# Patient Record
Sex: FEMALE | Race: WHITE | HISPANIC OR LATINO | Employment: UNEMPLOYED | URBAN - METROPOLITAN AREA
[De-identification: names, ages, dates, MRNs, and addresses within clinical notes are randomized per-mention and may not be internally consistent; named-entity substitution may affect disease eponyms.]

---

## 2017-11-29 ENCOUNTER — HOSPITAL ENCOUNTER (EMERGENCY)
Facility: HOSPITAL | Age: 42
Discharge: HOME | End: 2017-11-29
Attending: EMERGENCY MEDICINE
Payer: COMMERCIAL

## 2017-11-29 VITALS
OXYGEN SATURATION: 100 % | HEIGHT: 60 IN | SYSTOLIC BLOOD PRESSURE: 140 MMHG | TEMPERATURE: 99 F | HEART RATE: 70 BPM | RESPIRATION RATE: 18 BRPM | DIASTOLIC BLOOD PRESSURE: 67 MMHG | BODY MASS INDEX: 34.36 KG/M2 | WEIGHT: 175 LBS

## 2017-11-29 DIAGNOSIS — D50.0 IRON DEFICIENCY ANEMIA DUE TO CHRONIC BLOOD LOSS: ICD-10-CM

## 2017-11-29 DIAGNOSIS — R10.2 PELVIC PAIN: ICD-10-CM

## 2017-11-29 DIAGNOSIS — D25.9 UTERINE LEIOMYOMA, UNSPECIFIED LOCATION: Primary | ICD-10-CM

## 2017-11-29 LAB
ABO + RH BLD: NORMAL
ALBUMIN SERPL BCP-MCNC: 3.3 G/DL
ALP SERPL-CCNC: 81 U/L
ALT SERPL W/O P-5'-P-CCNC: 13 U/L
ANION GAP SERPL CALC-SCNC: 10 MMOL/L
ANISOCYTOSIS BLD QL SMEAR: ABNORMAL
AST SERPL-CCNC: 20 U/L
BASOPHILS # BLD AUTO: 0.02 K/UL
BASOPHILS NFR BLD: 0.2 %
BILIRUB SERPL-MCNC: 0.6 MG/DL
BLD GP AB SCN CELLS X3 SERPL QL: NORMAL
BUN SERPL-MCNC: 8 MG/DL
CALCIUM SERPL-MCNC: 8.8 MG/DL
CHLORIDE SERPL-SCNC: 106 MMOL/L
CO2 SERPL-SCNC: 21 MMOL/L
CREAT SERPL-MCNC: 0.6 MG/DL
DIFFERENTIAL METHOD: ABNORMAL
EOSINOPHIL # BLD AUTO: 0 K/UL
EOSINOPHIL NFR BLD: 0.5 %
ERYTHROCYTE [DISTWIDTH] IN BLOOD BY AUTOMATED COUNT: 20.6 %
EST. GFR  (AFRICAN AMERICAN): >60 ML/MIN/1.73 M^2
EST. GFR  (NON AFRICAN AMERICAN): >60 ML/MIN/1.73 M^2
GLUCOSE SERPL-MCNC: 91 MG/DL
HCT VFR BLD AUTO: 30.8 %
HGB BLD-MCNC: 9.1 G/DL
HYPOCHROMIA BLD QL SMEAR: ABNORMAL
IRON SERPL-MCNC: 27 UG/DL
LYMPHOCYTES # BLD AUTO: 1.5 K/UL
LYMPHOCYTES NFR BLD: 18.8 %
MCH RBC QN AUTO: 17.6 PG
MCHC RBC AUTO-ENTMCNC: 29.5 G/DL
MCV RBC AUTO: 60 FL
MONOCYTES # BLD AUTO: 0.7 K/UL
MONOCYTES NFR BLD: 8.4 %
NEUTROPHILS # BLD AUTO: 5.9 K/UL
NEUTROPHILS NFR BLD: 72.1 %
OVALOCYTES BLD QL SMEAR: ABNORMAL
PLATELET # BLD AUTO: 341 K/UL
PLATELET BLD QL SMEAR: ABNORMAL
PMV BLD AUTO: 8.5 FL
POIKILOCYTOSIS BLD QL SMEAR: SLIGHT
POLYCHROMASIA BLD QL SMEAR: ABNORMAL
POTASSIUM SERPL-SCNC: 3.9 MMOL/L
PROT SERPL-MCNC: 7.3 G/DL
RBC # BLD AUTO: 5.18 M/UL
SATURATED IRON: 6 %
SODIUM SERPL-SCNC: 137 MMOL/L
TARGETS BLD QL SMEAR: ABNORMAL
TOTAL IRON BINDING CAPACITY: 463 UG/DL
TRANSFERRIN SERPL-MCNC: 313 MG/DL
WBC # BLD AUTO: 8.17 K/UL

## 2017-11-29 PROCEDURE — 99284 EMERGENCY DEPT VISIT MOD MDM: CPT

## 2017-11-29 PROCEDURE — 86900 BLOOD TYPING SEROLOGIC ABO: CPT

## 2017-11-29 PROCEDURE — 85025 COMPLETE CBC W/AUTO DIFF WBC: CPT

## 2017-11-29 PROCEDURE — 80053 COMPREHEN METABOLIC PANEL: CPT

## 2017-11-29 PROCEDURE — 83540 ASSAY OF IRON: CPT

## 2017-11-29 PROCEDURE — 86901 BLOOD TYPING SEROLOGIC RH(D): CPT

## 2017-11-29 RX ORDER — FERROUS SULFATE 325(65) MG
325 TABLET ORAL DAILY
Qty: 30 TABLET | Refills: 2 | Status: SHIPPED | OUTPATIENT
Start: 2017-11-29

## 2017-11-29 RX ORDER — LISINOPRIL AND HYDROCHLOROTHIAZIDE 10; 12.5 MG/1; MG/1
1 TABLET ORAL DAILY
COMMUNITY

## 2017-11-29 NOTE — ED TRIAGE NOTES
Pt presents to ED today c/o pelvic pain and vaginal bleeding x 5 days. Pt reports this is her cycle, however bleeding is heavier than usual.

## 2017-11-29 NOTE — ED PROVIDER NOTES
Encounter Date: 11/29/2017       History     Chief Complaint   Patient presents with    Vaginal Bleeding     vaginal bleeding x 5 days with pelvic pain     Patient is a 42-year-old female presents to emergency department for evaluation of heavy bleeding that been present for the past 3 days with numerous amounts of dark red blood this morning.  She became dizzy upon standing.  There is no syncope.  She does have some pelvic pain with a palpable mass in her abdomen.  She is Ivorian speaking only and history is limited because I did not have an  at the time of the history however I will get an .  She denies any dysuria but does have some mild lower back discomfort.  She has no chest pain or shortness of breath.          Review of patient's allergies indicates:  No Known Allergies  No past medical history on file.  No past surgical history on file.  No family history on file.  Social History   Substance Use Topics    Smoking status: Not on file    Smokeless tobacco: Not on file    Alcohol use Not on file     Review of Systems   Constitutional: Negative for fever.   HENT: Negative for ear pain, rhinorrhea and sore throat.    Eyes: Negative for pain and visual disturbance.   Respiratory: Negative for cough and shortness of breath.    Cardiovascular: Negative for chest pain.   Gastrointestinal: Negative for abdominal pain, diarrhea, nausea and vomiting.   Genitourinary: Positive for pelvic pain and vaginal bleeding. Negative for difficulty urinating, dysuria, flank pain, vaginal discharge and vaginal pain.   Musculoskeletal: Positive for back pain. Negative for arthralgias.   Skin: Negative for rash.   Neurological: Positive for dizziness and light-headedness. Negative for syncope, weakness, numbness and headaches.   Hematological: Does not bruise/bleed easily.   Psychiatric/Behavioral: Negative for agitation and confusion.   All other systems reviewed and are negative.      Physical Exam      Initial Vitals [11/29/17 0909]   BP Pulse Resp Temp SpO2   124/67 72 18 98.7 °F (37.1 °C) 100 %      MAP       86         Physical Exam    Constitutional: She appears well-developed and well-nourished. No distress.   HENT:   Head: Normocephalic and atraumatic.   Mouth/Throat: Oropharynx is clear and moist.   Eyes: Conjunctivae and EOM are normal. Pupils are equal, round, and reactive to light.   Neck: Normal range of motion. Neck supple. No JVD present.   Cardiovascular: Normal rate, regular rhythm, normal heart sounds and intact distal pulses. Exam reveals no gallop and no friction rub.    No murmur heard.  Pulmonary/Chest: Breath sounds normal. She has no wheezes. She has no rhonchi. She has no rales.   Abdominal: Soft. There is tenderness (pelvic region with large pelvic mass).   Musculoskeletal: She exhibits no edema.   Neurological: She is alert and oriented to person, place, and time. She has normal strength. No sensory deficit.   Skin: Skin is warm and dry. No rash noted. No pallor.         ED Course   Procedures  Labs Reviewed   CBC W/ AUTO DIFFERENTIAL   COMPREHENSIVE METABOLIC PANEL   TYPE & SCREEN             Medical Decision Making:   Patient appears to have anemia likely secondary to bleeding fibroids.  I will start her on iron therapy and have her follow-up with OB/GYN.  She doesn't need transfusion or admission at this time given stable vital signs.  Discharge                    ED Course      Clinical Impression:   The primary encounter diagnosis was Uterine leiomyoma, unspecified location. Diagnoses of Pelvic pain and Iron deficiency anemia due to chronic blood loss were also pertinent to this visit.                           Alfredo Foy MD  11/29/17 1486

## 2017-11-29 NOTE — ED NOTES
APPEARANCE: Alert, oriented and in no acute distress.  CARDIAC: Normal rate and rhythm, no murmur heard.   PERIPHERAL VASCULAR: peripheral pulses present. Normal cap refill. No edema. Warm to touch.    RESPIRATORY:Normal rate and effort, breath sounds clear bilaterally throughout chest. Respirations are equal and unlabored no obvious signs of distress.  GASTRO: soft, bowel sounds normal, no tenderness, no abdominal distention.  MUSC: Full ROM. No bony tenderness or soft tissue tenderness. No obvious deformity.  SKIN: Skin is warm and dry, normal skin turgor, mucous membranes moist.  NEURO: 5/5 strength major flexors/extensors bilaterally. Sensory intact to light touch bilaterally. Sonoita coma scale: eyes open spontaneously-4, oriented & converses-5, obeys commands-6. No neurological abnormalities.   MENTAL STATUS: awake, alert and aware of environment.  EYE: PERRL, both eyes: pupils brisk and reactive to light. Normal size.  ENT: EARS: no obvious drainage. NOSE: no active bleeding.   : RLQ pelvic pain and vaginal bleeding x 5 days

## 2017-12-14 ENCOUNTER — OFFICE VISIT (OUTPATIENT)
Dept: OBSTETRICS AND GYNECOLOGY | Facility: CLINIC | Age: 42
End: 2017-12-14
Payer: COMMERCIAL

## 2017-12-14 VITALS
WEIGHT: 166.44 LBS | DIASTOLIC BLOOD PRESSURE: 88 MMHG | HEIGHT: 60 IN | BODY MASS INDEX: 32.68 KG/M2 | SYSTOLIC BLOOD PRESSURE: 128 MMHG

## 2017-12-14 DIAGNOSIS — D25.1 FIBROIDS, INTRAMURAL: Primary | ICD-10-CM

## 2017-12-14 PROCEDURE — 99203 OFFICE O/P NEW LOW 30 MIN: CPT | Mod: S$GLB,,, | Performed by: OBSTETRICS & GYNECOLOGY

## 2017-12-14 PROCEDURE — 99999 PR PBB SHADOW E&M-EST. PATIENT-LVL III: CPT | Mod: PBBFAC,,, | Performed by: OBSTETRICS & GYNECOLOGY

## 2017-12-15 NOTE — PROGRESS NOTES
"GYNECOLOGY  :  Lotus Fofana is a 42 y.o.    Here today for  ED follow up  Pelvic  pain and heavy menses   Feels bloated, and "heavy'  With menses, bleeds for 10-15 days, with clots .  Was told had fibroids   Unsure about what to do, but is considering having another child ( #6)       Past Medical History:   Diagnosis Date    Anemia     Hypertension      History reviewed. No pertinent surgical history.  Family History   Problem Relation Age of Onset    Diabetes Mother      Social History   Substance Use Topics    Smoking status: Never Smoker    Smokeless tobacco: Never Used    Alcohol use No     OB History    Para Term  AB Living   5 5 5         SAB TAB Ectopic Multiple Live Births                  # Outcome Date GA Lbr Mushtaq/2nd Weight Sex Delivery Anes PTL Lv   5 Term      Vag-Spont      4 Term      Vag-Spont      3 Term      Vag-Spont      2 Term      Vag-Spont      1 Term      Vag-Spont             /88   Ht 5' (1.524 m)   Wt 75.5 kg (166 lb 7.2 oz)   LMP  (Approximate)   BMI 32.51 kg/m²     Last PAP=  normal   LMP = 17  Last Mammogram = n/a  Last Colonoscopy  =n/a      COMPREHENSIVE GYN HISTORY:  G 5 P5     PAP History: Denies abnormal Paps.  Infection History: Denies STDs. Denies PID.  Benign History: Denies uterine fibroids. Denies ovarian cysts. Denies endometriosis.   Cancer History: Denies cervical cancer. Denies uterine cancer or hyperplasia. Denies ovarian cancer. Denies vulvar cancer or pre-cancer. Denies vaginal cancer or pre-cancer. Denies breast cancer. Denies colon cancer.  Sexual Activity History: (  ) Yes   (  )   no   Menstrual History: Age oxf menarche: (   )  years. Every (   )       days, flows for (   )   days. moderate  flow.  Dysmenorrhea History:  absent  Contraception:  nothing    ROS  GENERAL: Denies significant weight gain or weight loss. Feeling well overall.  SKIN: Denies rash or lesions.  Normal skin turgor  HEAD: Denies head injury or " headache.   NODES: Denies enlarged lymph nodes.   CHEST: Denies chest pain or shortness of breath.   CARDIOVASCULAR: Denies palpitations or left sided chest pain.   ABDOMEN: No abdominal pain,no  diarrhea,constipation  nausea, vomiting or rectal bleeding.   URINARY: normal  Frequency,no  Dysuria or burning on urination.   REPRODUCTIVE: Per HPI   BREASTS: The patient sometimes performs breast self-examination and denies pain, lumps, or nipple discharge.   HEMATOLOGIC: No easy bruisability or excessive bleeding.   MUSCULOSKELETAL: Denies joint pain or swelling.   NEUROLOGIC: Denies syncope or weakness.   PSYCHIATRIC: Denies depression, anxiety or mood swings.    Physical Exam     Constitutional: She is oriented to person, place, and time. She appears well-developed and well-nourished. No distress.   HENT:   Head: Normocephalic.   NECK: Neck symmetric without masses or thyromegaly.  Cardiovascular: Normal rate and regular rhythm.   Pulmonary/Chest: Effort normal and breath sounds normal. No respiratory distress. She has no wheezes.   Breasts: Symmetrical, no skin changes or visible lesions. No palpable masses, nipple discharge or adenopathy bilaterally.  Abdominal: Soft not distended. Bowel sounds are normal. She exhibits   no masses . No tenderness to palpation.   Genitourinary: Pelvic exam was performed with patient supine.   External genitalia normal no lesions.Normal hair distribution   Adequate perineal body,normal urethral meatus   Vagina moist and well rugated without lesions, no vaginal  Discharge.   Cervix pink and without lesions. No bleeding. No significant cystocele or rectocele.  Bimanual exam showed uterus increased in size to 20 weeks, irregular, fixed  Adnexa impossible to evaluate   . Adnexa without masses or tenderness. Urethra and bladder normal  Extremities normal no cyanosis ,edema.     Procedures:  TVUS       A/P Lotus Fofana 42 y.o.     Dx=  1- Uterine fibroids   2-  3-    Plan:  Will  review US   Patient needs to decide about future fertility  I counseled the risks of pregnancy at 42 years of age , + CHTN  Also after extensive myomectomy, fertility and pregnancy viability can be severely compromised   Will come back in 3-4 weeks and will discuss managment  I recommend hysterectomy, considering uterine  size, patients age, and parity        Patient was counseled today on A.C.S. Pap guidelines and recommendations for yearly pelvic exams, mammograms and monthly self breast exams; to see her PCP for other health maintenance, nutrition and weight gain counseling, discussed lab values.  Discussed colonoscopy recommendations every 10 years starting at age 50   Calcium and vit D daily intake     F/u in 1 m or PRN    Rc Eddy M.D.   OB/GYN

## 2017-12-19 ENCOUNTER — PROCEDURE VISIT (OUTPATIENT)
Dept: OBSTETRICS AND GYNECOLOGY | Facility: CLINIC | Age: 42
End: 2017-12-19
Payer: COMMERCIAL

## 2017-12-19 DIAGNOSIS — N93.9 ABNORMAL UTERINE BLEEDING (AUB): Primary | ICD-10-CM

## 2017-12-19 DIAGNOSIS — D25.1 FIBROIDS, INTRAMURAL: ICD-10-CM

## 2017-12-19 PROCEDURE — 76856 US EXAM PELVIC COMPLETE: CPT | Mod: S$GLB,,, | Performed by: OBSTETRICS & GYNECOLOGY

## 2018-01-05 ENCOUNTER — OFFICE VISIT (OUTPATIENT)
Dept: OBSTETRICS AND GYNECOLOGY | Facility: CLINIC | Age: 43
End: 2018-01-05
Payer: COMMERCIAL

## 2018-01-05 VITALS
SYSTOLIC BLOOD PRESSURE: 106 MMHG | DIASTOLIC BLOOD PRESSURE: 66 MMHG | HEIGHT: 60 IN | BODY MASS INDEX: 32.98 KG/M2 | WEIGHT: 168 LBS

## 2018-01-05 DIAGNOSIS — G89.29 CHRONIC PELVIC PAIN IN FEMALE: ICD-10-CM

## 2018-01-05 DIAGNOSIS — D25.0 SUBMUCOUS LEIOMYOMA OF UTERUS: ICD-10-CM

## 2018-01-05 DIAGNOSIS — N93.9 ABNORMAL UTERINE BLEEDING (AUB): Primary | ICD-10-CM

## 2018-01-05 DIAGNOSIS — R10.2 CHRONIC PELVIC PAIN IN FEMALE: ICD-10-CM

## 2018-01-05 PROCEDURE — 99999 PR PBB SHADOW E&M-EST. PATIENT-LVL III: CPT | Mod: PBBFAC,,, | Performed by: OBSTETRICS & GYNECOLOGY

## 2018-01-05 PROCEDURE — 99213 OFFICE O/P EST LOW 20 MIN: CPT | Mod: S$GLB,,, | Performed by: OBSTETRICS & GYNECOLOGY

## 2018-01-08 NOTE — PROGRESS NOTES
"GYNECOLOGY  :  Lotus Fofana is a 42 y.o.    Here today for follow up   US reviewed;  Enlarged uterus with multiple fibroids , (#6)  About 5x6 cms each     Seen before for Pelvic pain and Heavy periods     Pelvic  pain and heavy menses   Feels bloated, and "heavy'  With menses, bleeds for 10-15 days, with clots .    Unsure about what to do, but is considering having another child ( #6)       Past Medical History:   Diagnosis Date    Anemia     Hypertension      History reviewed. No pertinent surgical history.  Family History   Problem Relation Age of Onset    Diabetes Mother      Social History   Substance Use Topics    Smoking status: Never Smoker    Smokeless tobacco: Never Used    Alcohol use No     OB History    Para Term  AB Living   5 5 5         SAB TAB Ectopic Multiple Live Births                  # Outcome Date GA Lbr Mushtaq/2nd Weight Sex Delivery Anes PTL Lv   5 Term      Vag-Spont      4 Term      Vag-Spont      3 Term      Vag-Spont      2 Term      Vag-Spont      1 Term      Vag-Spont             /66   Ht 5' (1.524 m)   Wt 76.2 kg (167 lb 15.9 oz)   BMI 32.81 kg/m²     Last PAP=  normal   LMP = 17  Last Mammogram = n/a  Last Colonoscopy  =n/a      COMPREHENSIVE GYN HISTORY:  G 5 P5     PAP History: Denies abnormal Paps.  Infection History: Denies STDs. Denies PID.  Benign History: Denies uterine fibroids. Denies ovarian cysts. Denies endometriosis.   Cancer History: Denies cervical cancer. Denies uterine cancer or hyperplasia. Denies ovarian cancer. Denies vulvar cancer or pre-cancer. Denies vaginal cancer or pre-cancer. Denies breast cancer. Denies colon cancer.  Sexual Activity History: (  ) Yes   (  )   no   Menstrual History: Age oxf menarche: (   )  years. Every (   )       days, flows for (   )   days. moderate  flow.  Dysmenorrhea History:  absent  Contraception:  nothing    ROS  GENERAL: Denies significant weight gain or weight loss. Feeling well " overall.  SKIN: Denies rash or lesions.  Normal skin turgor  HEAD: Denies head injury or headache.   NODES: Denies enlarged lymph nodes.   CHEST: Denies chest pain or shortness of breath.   CARDIOVASCULAR: Denies palpitations or left sided chest pain.   ABDOMEN: No abdominal pain,no  diarrhea,constipation  nausea, vomiting or rectal bleeding.   URINARY: normal  Frequency,no  Dysuria or burning on urination.   REPRODUCTIVE: Per HPI   BREASTS: The patient sometimes performs breast self-examination and denies pain, lumps, or nipple discharge.   HEMATOLOGIC: No easy bruisability or excessive bleeding.   MUSCULOSKELETAL: Denies joint pain or swelling.   NEUROLOGIC: Denies syncope or weakness.   PSYCHIATRIC: Denies depression, anxiety or mood swings.    Physical Exam     Constitutional: She is oriented to person, place, and time. She appears well-developed and well-nourished. No distress.   HENT:   Head: Normocephalic.   NECK: Neck symmetric without masses or thyromegaly.  Cardiovascular: Normal rate and regular rhythm.   Pulmonary/Chest: Effort normal and breath sounds normal. No respiratory distress. She has no wheezes.   Breasts: Symmetrical, no skin changes or visible lesions. No palpable masses, nipple discharge or adenopathy bilaterally.  Abdominal: Soft not distended. Bowel sounds are normal. She exhibits   no masses . No tenderness to palpation.   Genitourinary: Pelvic exam was performed with patient supine.   External genitalia normal no lesions.Normal hair distribution   Adequate perineal body,normal urethral meatus   Vagina moist and well rugated without lesions, no vaginal  Discharge.   Cervix pink and without lesions. No bleeding. No significant cystocele or rectocele.  Bimanual exam showed uterus increased in size to 20 weeks, irregular, fixed  Adnexa impossible to evaluate   . Adnexa without masses or tenderness. Urethra and bladder normal  Extremities normal no cyanosis ,edema.     Procedures:  TVUS        A/P Lotus Fofana 42 y.o.     Dx=  1- Uterine fibroids   2-  3-    Plan:  Will review US   Patient needs to decide about future fertility  I counseled the risks of pregnancy at 42 years of age , + CHTN  Also after extensive myomectomy, fertility and pregnancy viability can be severely compromised   Will come back in 3-4 weeks and will discuss managment  I recommend hysterectomy, considering uterine  size, patients age, and parity        Patient was counseled today on A.C.S. Pap guidelines and recommendations for yearly pelvic exams, mammograms and monthly self breast exams; to see her PCP for other health maintenance, nutrition and weight gain counseling, discussed lab values.  Discussed colonoscopy recommendations every 10 years starting at age 50   Calcium and vit D daily intake     F/u patient will thinck about options discussed, and will let us know which path she wants to follow     Rc Eddy M.D.   OB/GYN

## 2018-04-04 ENCOUNTER — TELEPHONE (OUTPATIENT)
Dept: OBSTETRICS AND GYNECOLOGY | Facility: CLINIC | Age: 43
End: 2018-04-04

## 2018-04-04 NOTE — TELEPHONE ENCOUNTER
----- Message from Mary Allen sent at 4/3/2018  3:37 PM CDT -----  Contact: Self [phone]  Patient is calling to schedule a procedure. Please advice

## 2018-04-05 NOTE — TELEPHONE ENCOUNTER
----- Message from Mary Aleln sent at 4/5/2018  9:10 AM CDT -----  Contact: Self [phone]  Patient Returning Your Phone Call

## 2018-04-25 NOTE — TELEPHONE ENCOUNTER
Appointment made for 05/03/18 @3:30 to Discuss procedure. Patient agreed and verbalized understanding.

## 2018-04-25 NOTE — TELEPHONE ENCOUNTER
----- Message from Jenny Thompson sent at 4/24/2018  4:34 PM CDT -----  Contact: self, [phone]  [place] speaking patient called in requesting to speak with you to schedule her procedure. Please advise.

## 2018-05-03 ENCOUNTER — OFFICE VISIT (OUTPATIENT)
Dept: OBSTETRICS AND GYNECOLOGY | Facility: CLINIC | Age: 43
End: 2018-05-03
Payer: COMMERCIAL

## 2018-05-03 VITALS
DIASTOLIC BLOOD PRESSURE: 86 MMHG | WEIGHT: 169.06 LBS | HEIGHT: 60 IN | SYSTOLIC BLOOD PRESSURE: 130 MMHG | BODY MASS INDEX: 33.19 KG/M2

## 2018-05-03 DIAGNOSIS — N93.9 ABNORMAL UTERINE BLEEDING (AUB): ICD-10-CM

## 2018-05-03 DIAGNOSIS — D25.1 FIBROIDS, INTRAMURAL: Primary | ICD-10-CM

## 2018-05-03 DIAGNOSIS — R10.2 PELVIC PAIN IN FEMALE: ICD-10-CM

## 2018-05-03 PROCEDURE — 99999 PR PBB SHADOW E&M-EST. PATIENT-LVL III: CPT | Mod: PBBFAC,,, | Performed by: OBSTETRICS & GYNECOLOGY

## 2018-05-03 PROCEDURE — 99213 OFFICE O/P EST LOW 20 MIN: CPT | Mod: S$GLB,,, | Performed by: OBSTETRICS & GYNECOLOGY

## 2018-05-03 PROCEDURE — 3008F BODY MASS INDEX DOCD: CPT | Mod: CPTII,S$GLB,, | Performed by: OBSTETRICS & GYNECOLOGY

## 2018-05-03 RX ORDER — CITALOPRAM 10 MG/1
TABLET ORAL
COMMUNITY
Start: 2018-04-30

## 2018-05-04 NOTE — PROGRESS NOTES
"GYNECOLOGY  :  Lotus Fofana is a 42 y.o.    Here today for follow up   Decided to proceed with  Proposed surgery for symptomatic uterine fibroids     US reviewed;  Enlarged uterus with multiple fibroids , (#6)  About 5x6 cms each     Seen before for Pelvic pain and Heavy periods     Pelvic  pain and heavy menses   Feels bloated, and "heavy'  With menses, bleeds for 10-15 days, with clots .    Unsure about what to do, but is considering having another child ( #6)       Past Medical History:   Diagnosis Date    Anemia     Hypertension      History reviewed. No pertinent surgical history.  Family History   Problem Relation Age of Onset    Diabetes Mother      Social History   Substance Use Topics    Smoking status: Never Smoker    Smokeless tobacco: Never Used    Alcohol use No     OB History    Para Term  AB Living   5 5 5         SAB TAB Ectopic Multiple Live Births                  # Outcome Date GA Lbr Mushtaq/2nd Weight Sex Delivery Anes PTL Lv   5 Term      Vag-Spont      4 Term      Vag-Spont      3 Term      Vag-Spont      2 Term      Vag-Spont      1 Term      Vag-Spont             /86   Ht 5' (1.524 m)   Wt 76.7 kg (169 lb 1.5 oz)   LMP 2018   BMI 33.02 kg/m²     Last PAP=  normal   LMP = 17  Last Mammogram = n/a  Last Colonoscopy  =n/a      COMPREHENSIVE GYN HISTORY:  G 5 P5     PAP History: Denies abnormal Paps.  Infection History: Denies STDs. Denies PID.  Benign History: Denies uterine fibroids. Denies ovarian cysts. Denies endometriosis.   Cancer History: Denies cervical cancer. Denies uterine cancer or hyperplasia. Denies ovarian cancer. Denies vulvar cancer or pre-cancer. Denies vaginal cancer or pre-cancer. Denies breast cancer. Denies colon cancer.  Sexual Activity History: (  ) Yes   (  )   no   Menstrual History: Age oxf menarche: (   )  years. Every (   )       days, flows for (   )   days. moderate  flow.  Dysmenorrhea History:  " absent  Contraception:  nothing    ROS  GENERAL: Denies significant weight gain or weight loss. Feeling well overall.  SKIN: Denies rash or lesions.  Normal skin turgor  HEAD: Denies head injury or headache.   NODES: Denies enlarged lymph nodes.   CHEST: Denies chest pain or shortness of breath.   CARDIOVASCULAR: Denies palpitations or left sided chest pain.   ABDOMEN: No abdominal pain,no  diarrhea,constipation  nausea, vomiting or rectal bleeding.   URINARY: normal  Frequency,no  Dysuria or burning on urination.   REPRODUCTIVE: Per HPI   BREASTS: The patient sometimes performs breast self-examination and denies pain, lumps, or nipple discharge.   HEMATOLOGIC: No easy bruisability or excessive bleeding.   MUSCULOSKELETAL: Denies joint pain or swelling.   NEUROLOGIC: Denies syncope or weakness.   PSYCHIATRIC: Denies depression, anxiety or mood swings.    Physical Exam     Constitutional: She is oriented to person, place, and time. She appears well-developed and well-nourished. No distress.   HENT:   Head: Normocephalic.   NECK: Neck symmetric without masses or thyromegaly.  Cardiovascular: Normal rate and regular rhythm.   Pulmonary/Chest: Effort normal and breath sounds normal. No respiratory distress. She has no wheezes.   Breasts: Symmetrical, no skin changes or visible lesions. No palpable masses, nipple discharge or adenopathy bilaterally.  Abdominal: Soft not distended. Bowel sounds are normal. Uterus palpated to the umbilicus    Mild  tenderness to palpation.   Genitourinary: Pelvic exam was performed with patient supine.   External genitalia normal no lesions.Normal hair distribution   Adequate perineal body,normal urethral meatus   Vagina moist and well rugated without lesions, no vaginal  Discharge.   Cervix pink and without lesions. No bleeding. No significant cystocele or rectocele.  Bimanual exam showed uterus increased in size to 20 weeks, irregular, fixed  Adnexa impossible to evaluate   . Adnexa  without masses or tenderness. Urethra and bladder normal  Extremities normal no cyanosis ,edema.     Procedures:  TVUS       A/P Lotus Fofana 42 y.o.     Dx=  1- Uterine fibroids   2- Pelvic pain   3- Abnormal uterine bleeding     Plan:    Patient was counseled on management options   including observation   (expectant management)  and surgical alternatives (hysterectomy )  were discussed as well .   Due to uterine size, will plan for THEA  With ovarian preservation     Info sent to [facility] for scheduling     Patient was counseled today on A.C.S. Pap guidelines and recommendations for yearly pelvic exams, mammograms and monthly self breast exams; to see her PCP for other health maintenance, nutrition and weight gain counseling, discussed lab values.  Discussed colonoscopy recommendations every 10 years starting at age 50   Calcium and vit D daily intake       Rc Eddy M.D.   OB/GYN

## 2018-05-10 ENCOUNTER — TELEPHONE (OUTPATIENT)
Dept: OBSTETRICS AND GYNECOLOGY | Facility: CLINIC | Age: 43
End: 2018-05-10

## 2018-05-10 NOTE — TELEPHONE ENCOUNTER
----- Message from Jenny Thompson sent at 5/10/2018  9:58 AM CDT -----  Contact: self, [phone]  [place]ian speaking only patient requests to speak with you regarding surgery and insurance coverage ending. Please advise.

## 2018-05-10 NOTE — TELEPHONE ENCOUNTER
Patient is unsure if her insurance has expierenced and wants to know if she will still be able to proceed with Hysterectomy? Please advise.

## 2018-05-10 NOTE — TELEPHONE ENCOUNTER
Patient notified she is no longer covered. Gave her number to billing to see if they can work a payment plan with her. She will call back if she wants to proceed.

## 2025-06-30 ENCOUNTER — HOSPITAL ENCOUNTER (EMERGENCY)
Facility: HOSPITAL | Age: 50
Discharge: HOME | End: 2025-06-30
Attending: EMERGENCY MEDICINE

## 2025-06-30 VITALS
HEART RATE: 80 BPM | BODY MASS INDEX: 39.27 KG/M2 | SYSTOLIC BLOOD PRESSURE: 146 MMHG | HEIGHT: 64 IN | TEMPERATURE: 98 F | RESPIRATION RATE: 16 BRPM | WEIGHT: 230 LBS | DIASTOLIC BLOOD PRESSURE: 83 MMHG | OXYGEN SATURATION: 95 %

## 2025-06-30 DIAGNOSIS — N20.1 CALCULUS OF URETEROVESICAL JUNCTION (UVJ): Primary | ICD-10-CM

## 2025-06-30 LAB
ABSOLUTE EOSINOPHIL (OHS): 0.07 K/UL
ABSOLUTE MONOCYTE (OHS): 0.99 K/UL (ref 0.3–1)
ABSOLUTE NEUTROPHIL COUNT (OHS): 6.45 K/UL (ref 1.8–7.7)
ALBUMIN SERPL BCP-MCNC: 3.7 G/DL (ref 3.5–5.2)
ALP SERPL-CCNC: 120 UNIT/L (ref 40–150)
ALT SERPL W/O P-5'-P-CCNC: 37 UNIT/L (ref 10–44)
ANION GAP (OHS): 10 MMOL/L (ref 8–16)
AST SERPL-CCNC: 25 UNIT/L (ref 11–45)
BASOPHILS # BLD AUTO: 0.04 K/UL
BASOPHILS NFR BLD AUTO: 0.3 %
BILIRUB SERPL-MCNC: 0.3 MG/DL (ref 0.1–1)
BILIRUB UR QL STRIP.AUTO: NEGATIVE
BUN SERPL-MCNC: 12 MG/DL (ref 6–20)
CALCIUM SERPL-MCNC: 9.5 MG/DL (ref 8.7–10.5)
CHLORIDE SERPL-SCNC: 104 MMOL/L (ref 95–110)
CLARITY UR: CLEAR
CO2 SERPL-SCNC: 24 MMOL/L (ref 23–29)
COLOR UR AUTO: NORMAL
CREAT SERPL-MCNC: 0.7 MG/DL (ref 0.5–1.4)
ERYTHROCYTE [DISTWIDTH] IN BLOOD BY AUTOMATED COUNT: 13.6 % (ref 11.5–14.5)
GFR SERPLBLD CREATININE-BSD FMLA CKD-EPI: >60 ML/MIN/1.73/M2
GLUCOSE SERPL-MCNC: 144 MG/DL (ref 70–110)
GLUCOSE UR QL STRIP: NEGATIVE
HCT VFR BLD AUTO: 41.9 % (ref 37–48.5)
HGB BLD-MCNC: 14.2 GM/DL (ref 12–16)
HGB UR QL STRIP: NEGATIVE
HOLD SPECIMEN: NORMAL
HOLD SPECIMEN: NORMAL
IMM GRANULOCYTES # BLD AUTO: 0.05 K/UL (ref 0–0.04)
IMM GRANULOCYTES NFR BLD AUTO: 0.4 % (ref 0–0.5)
KETONES UR QL STRIP: NEGATIVE
LACTATE SERPL-SCNC: 1.8 MMOL/L (ref 0.5–2.2)
LEUKOCYTE ESTERASE UR QL STRIP: NEGATIVE
LIPASE SERPL-CCNC: 26 U/L (ref 4–60)
LYMPHOCYTES # BLD AUTO: 4.37 K/UL (ref 1–4.8)
MCH RBC QN AUTO: 26.8 PG (ref 27–31)
MCHC RBC AUTO-ENTMCNC: 33.9 G/DL (ref 32–36)
MCV RBC AUTO: 79 FL (ref 82–98)
NITRITE UR QL STRIP: NEGATIVE
NUCLEATED RBC (/100WBC) (OHS): 0 /100 WBC
PH UR STRIP: 8 [PH]
PLATELET # BLD AUTO: 311 K/UL (ref 150–450)
PMV BLD AUTO: 9.3 FL (ref 9.2–12.9)
POTASSIUM SERPL-SCNC: 3.4 MMOL/L (ref 3.5–5.1)
PROT SERPL-MCNC: 8.1 GM/DL (ref 6–8.4)
PROT UR QL STRIP: NEGATIVE
RBC # BLD AUTO: 5.29 M/UL (ref 4–5.4)
RELATIVE EOSINOPHIL (OHS): 0.6 %
RELATIVE LYMPHOCYTE (OHS): 36.5 % (ref 18–48)
RELATIVE MONOCYTE (OHS): 8.3 % (ref 4–15)
RELATIVE NEUTROPHIL (OHS): 53.9 % (ref 38–73)
SODIUM SERPL-SCNC: 138 MMOL/L (ref 136–145)
SP GR UR STRIP: 1.01
UROBILINOGEN UR STRIP-ACNC: NEGATIVE EU/DL
WBC # BLD AUTO: 11.97 K/UL (ref 3.9–12.7)

## 2025-06-30 PROCEDURE — 63600175 PHARM REV CODE 636 W HCPCS: Mod: JZ,TB | Performed by: EMERGENCY MEDICINE

## 2025-06-30 PROCEDURE — 99285 EMERGENCY DEPT VISIT HI MDM: CPT | Mod: 25

## 2025-06-30 PROCEDURE — 83605 ASSAY OF LACTIC ACID: CPT | Performed by: EMERGENCY MEDICINE

## 2025-06-30 PROCEDURE — 25000003 PHARM REV CODE 250: Performed by: EMERGENCY MEDICINE

## 2025-06-30 PROCEDURE — 96375 TX/PRO/DX INJ NEW DRUG ADDON: CPT

## 2025-06-30 PROCEDURE — 85025 COMPLETE CBC W/AUTO DIFF WBC: CPT | Performed by: EMERGENCY MEDICINE

## 2025-06-30 PROCEDURE — 25500020 PHARM REV CODE 255: Performed by: EMERGENCY MEDICINE

## 2025-06-30 PROCEDURE — 81003 URINALYSIS AUTO W/O SCOPE: CPT | Performed by: EMERGENCY MEDICINE

## 2025-06-30 PROCEDURE — 96374 THER/PROPH/DIAG INJ IV PUSH: CPT

## 2025-06-30 PROCEDURE — 80053 COMPREHEN METABOLIC PANEL: CPT | Performed by: EMERGENCY MEDICINE

## 2025-06-30 PROCEDURE — 96361 HYDRATE IV INFUSION ADD-ON: CPT

## 2025-06-30 PROCEDURE — 83690 ASSAY OF LIPASE: CPT | Performed by: EMERGENCY MEDICINE

## 2025-06-30 RX ORDER — SODIUM CHLORIDE 9 MG/ML
1000 INJECTION, SOLUTION INTRAVENOUS
Status: COMPLETED | OUTPATIENT
Start: 2025-06-30 | End: 2025-06-30

## 2025-06-30 RX ORDER — ONDANSETRON 4 MG/1
4 TABLET, ORALLY DISINTEGRATING ORAL EVERY 8 HOURS PRN
Qty: 12 TABLET | Refills: 0 | Status: SHIPPED | OUTPATIENT
Start: 2025-06-30 | End: 2025-07-03

## 2025-06-30 RX ORDER — ONDANSETRON 4 MG/1
4 TABLET, ORALLY DISINTEGRATING ORAL
Status: COMPLETED | OUTPATIENT
Start: 2025-06-30 | End: 2025-06-30

## 2025-06-30 RX ORDER — MORPHINE SULFATE 4 MG/ML
4 INJECTION, SOLUTION INTRAMUSCULAR; INTRAVENOUS
Refills: 0 | Status: COMPLETED | OUTPATIENT
Start: 2025-06-30 | End: 2025-06-30

## 2025-06-30 RX ORDER — TAMSULOSIN HYDROCHLORIDE 0.4 MG/1
0.4 CAPSULE ORAL DAILY
Qty: 15 CAPSULE | Refills: 0 | Status: SHIPPED | OUTPATIENT
Start: 2025-06-30 | End: 2025-07-15

## 2025-06-30 RX ORDER — KETOROLAC TROMETHAMINE 10 MG/1
10 TABLET, FILM COATED ORAL EVERY 6 HOURS PRN
Qty: 15 TABLET | Refills: 0 | Status: SHIPPED | OUTPATIENT
Start: 2025-06-30 | End: 2025-07-03

## 2025-06-30 RX ORDER — OXYCODONE AND ACETAMINOPHEN 5; 325 MG/1; MG/1
1 TABLET ORAL EVERY 6 HOURS PRN
Qty: 12 TABLET | Refills: 0 | Status: SHIPPED | OUTPATIENT
Start: 2025-06-30 | End: 2025-07-03

## 2025-06-30 RX ORDER — KETOROLAC TROMETHAMINE 30 MG/ML
15 INJECTION, SOLUTION INTRAMUSCULAR; INTRAVENOUS
Status: COMPLETED | OUTPATIENT
Start: 2025-06-30 | End: 2025-06-30

## 2025-06-30 RX ADMIN — SODIUM CHLORIDE 1000 ML: 9 INJECTION, SOLUTION INTRAVENOUS at 01:06

## 2025-06-30 RX ADMIN — MORPHINE SULFATE 4 MG: 4 INJECTION INTRAVENOUS at 02:06

## 2025-06-30 RX ADMIN — ONDANSETRON 4 MG: 4 TABLET, ORALLY DISINTEGRATING ORAL at 01:06

## 2025-06-30 RX ADMIN — KETOROLAC TROMETHAMINE 15 MG: 30 INJECTION, SOLUTION INTRAMUSCULAR; INTRAVENOUS at 01:06

## 2025-06-30 RX ADMIN — IOHEXOL 100 ML: 350 INJECTION, SOLUTION INTRAVENOUS at 02:06

## 2025-06-30 NOTE — DISCHARGE INSTRUCTIONS
Tiene un cálculo renal de 7 mm ubicado en la unión del uréter con la vejiga. [place] los analgésicos recetados para el dolor, con la jairon de expulsar el cálculo. Consulte con urología si necesita más evaluación y tratamiento. Regrese a urgencias de inmediato si presenta síntomas nuevos o empeora, calin fiebre o dolor que no se controla con los analgésicos recetados. Comuníquese con el consultorio de urología que figura en villatoro formulario de shyann para programar mario alberto priscilla de seguimiento lo antes posible.

## 2025-06-30 NOTE — ED NOTES
Pt states earlier today she began to have RLQ pain. Pt states she has a kidney stone. Pt states she has been running hot and getting cold x 2 hours. Pt states she only took tylenol at home but no relief. Pt states she had stones removed surgically a few years ago. Pt states she takes amlodipine at home for htn.

## 2025-06-30 NOTE — ED PROVIDER NOTES
"Encounter Date: 6/30/2025       History     Chief Complaint   Patient presents with    Flank Pain     Pt presents to the ED c/o flank pain on the R side. Pt also reports n/v.      Patient is a 49-year-old female with a history of anemia hypertension previous kidney stones who presents to the ER with a complaint of right-sided flank pain.  Patient reports acute onset of right-sided flank pain described as stabbing radiating to the right lower quadrant of her abdomen that started approximately 3 hours ago.  She reports no improvement of pain with change in body position.  She does report nausea without vomiting.  States her last bowel movement was earlier today.  She denies any fever.  She denies any difficulty urinating but she states that her urine "is hot. "Vital signs notable for elevated blood pressure of 241/140 but patient states that she has not taken her blood pressure medications today.      Patient offered formal  but declined opting to use her relatives at bedside.      Review of patient's allergies indicates:  No Known Allergies  Past Medical History:   Diagnosis Date    Anemia     Hypertension      No past surgical history on file.  Family History   Problem Relation Name Age of Onset    Diabetes Mother       Social History[1]  Review of Systems   Constitutional:  Negative for chills and fatigue.   Cardiovascular:  Negative for chest pain, palpitations and leg swelling.   Gastrointestinal:  Positive for nausea. Negative for abdominal distention, abdominal pain, constipation and vomiting.   Genitourinary:  Positive for dysuria and frequency. Negative for decreased urine volume, difficulty urinating, dyspareunia, hematuria and pelvic pain.   Musculoskeletal:  Negative for back pain and myalgias.   Skin:  Negative for pallor and rash.   Neurological:  Negative for dizziness and headaches.   Psychiatric/Behavioral:  Negative for agitation and confusion.        Physical Exam     Initial " Vitals   BP Pulse Resp Temp SpO2   06/30/25 0052 06/30/25 0052 06/30/25 0052 06/30/25 0055 06/30/25 0052   (!) 241/140 98 18 98.2 °F (36.8 °C) 96 %      MAP       --                Physical Exam    Nursing note and vitals reviewed.  Constitutional: She appears well-developed and well-nourished. She appears distressed.   HENT:   Head: Normocephalic and atraumatic.   Eyes: EOM are normal. Pupils are equal, round, and reactive to light.   Neck:   Normal range of motion.  Cardiovascular:  Normal rate, regular rhythm and normal heart sounds.           Pulmonary/Chest: Breath sounds normal.   Abdominal: Abdomen is soft.   TTP right lower quadrant   Right CVA tenderness  Obese abdomen   Musculoskeletal:         General: Normal range of motion.      Cervical back: Normal range of motion.     Neurological: She is alert and oriented to person, place, and time. She has normal strength.   Skin: Skin is warm.         ED Course   Procedures  Labs Reviewed   COMPREHENSIVE METABOLIC PANEL - Abnormal       Result Value    Sodium 138      Potassium 3.4 (*)     Chloride 104      CO2 24      Glucose 144 (*)     BUN 12      Creatinine 0.7      Calcium 9.5      Protein Total 8.1      Albumin 3.7      Bilirubin Total 0.3            AST 25      ALT 37      Anion Gap 10      eGFR >60     CBC WITH DIFFERENTIAL - Abnormal    WBC 11.97      RBC 5.29      HGB 14.2      HCT 41.9      MCV 79 (*)     MCH 26.8 (*)     MCHC 33.9      RDW 13.6      Platelet Count 311      MPV 9.3      Nucleated RBC 0      Neut % 53.9      Lymph % 36.5      Mono % 8.3      Eos % 0.6      Basophil % 0.3      Imm Grans % 0.4      Neut # 6.45      Lymph # 4.37      Mono # 0.99      Eos # 0.07      Baso # 0.04      Imm Grans # 0.05 (*)    LIPASE - Normal    Lipase Level 26     LACTIC ACID, PLASMA - Normal    Lactic Acid Level 1.8      Narrative:     Falsely low lactic acid results can be found in samples containing >=13.0 mg/dL total bilirubin and/or >=3.5 mg/dL  direct bilirubin.    URINALYSIS, REFLEX TO URINE CULTURE - Normal    Color, UA Straw      Appearance, UA Clear      pH, UA 8.0      Spec Grav UA 1.010      Protein, UA Negative      Glucose, UA Negative      Ketones, UA Negative      Bilirubin, UA Negative      Blood, UA Negative      Nitrites, UA Negative      Urobilinogen, UA Negative      Leukocyte Esterase, UA Negative     CBC W/ AUTO DIFFERENTIAL    Narrative:     The following orders were created for panel order CBC auto differential.  Procedure                               Abnormality         Status                     ---------                               -----------         ------                     CBC with Differential[[id number]]        Abnormal            Final result                 Please view results for these tests on the individual orders.   EXTRA TUBES    Narrative:     The following orders were created for panel order EXTRA TUBES.  Procedure                               Abnormality         Status                     ---------                               -----------         ------                     Light Green Top Hold[[id number]]                             In process                 Lavender Top Hold[[id number]]                               In process                   Please view results for these tests on the individual orders.   LIGHT GREEN TOP HOLD   LAVENDER TOP HOLD          Imaging Results               CT Abdomen Pelvis With IV Contrast NO Oral Contrast (Final result)  Result time 06/30/25 03:49:10      Final result by Daysi Benedict MD (06/30/25 03:49:10)                   Impression:      1. 6.8 mm calculus within the distal ureter at the junction with the bladder wall/UVJ resulting in mild hydroureter and hydronephrosis but perinephric stranding is also seen raising question of possible ascending UTI.  Correlate clinically with labs.  2. Hepatosteatosis and mild hepatomegaly.  3. Calcified granuloma in the right lung  base.  4. Small umbilical hernia containing fat only.  This report was flagged in Epic as abnormal.      Electronically signed by: Daysi Benedict  Date:    06/30/2025  Time:    03:49               Narrative:    EXAMINATION:  CT ABDOMEN PELVIS WITH IV CONTRAST    CLINICAL HISTORY:  Flank pain, kidney stone suspected;RLQ abdominal pain (Age >= 14y);    TECHNIQUE:  Low dose axial images, sagittal and coronal reformations were obtained from the lung bases to the pubic symphysis following the IV administration of 100 mL of Omnipaque 350 .  Oral contrast was not administered.    COMPARISON:  None.    FINDINGS:  Abdomen:    - Lower thorax:Unremarkable.    - Lung bases: Dependent atelectasis noted.  There is calcified granuloma in the right lung base.    - Liver: The liver is enlarged measuring 20 cm.  Hepatic steatosis is noted.  The hepatic veins and portal veins are patent.    - Gallbladder: No calcified gallstones.    - Bile Ducts: No evidence of intra or extra hepatic biliary ductal dilation.    - Spleen: Negative.  A small assessory spleen is noted.    - Kidneys: There is a 6.8 mm calculus present at the junction of the bladder wall and distal ureter/UVJ.  This calculus is not completely passed into the bladder and given size may not pass.  There is resulting mild right hydroureter and hydronephrosis.  Perinephric stranding is present.  There is a delayed right nephrogram.  Can not exclude superimposed infection.    The left kidney is unremarkable.    - Adrenals: Unremarkable.    - Pancreas: No mass or peripancreatic fat stranding.    - Retroperitoneum:  No significant adenopathy.    - Vascular: No abdominal aortic aneurysm.    - Abdominal wall:  Umbilical hernia containing fat only.  The defect at the umbilicus measures 1 cm.    Pelvis:    No pelvic mass, adenopathy, or free fluid.  The uterus is not visualized..  The adnexal regions are not enlarged.    Bowel/Mesentery:    There are sigmoid diverticula but no  evidence of diverticulitis.  Study is slightly limited by motion artifact.  The appendix is normal.    Bones:  No acute osseous abnormality and no suspicious lytic or blastic lesion.  Multilevel mild degenerative changes of the spine noted.                                       Medications   0.9% NaCl infusion (0 mLs Intravenous Stopped 6/30/25 0225)   ondansetron disintegrating tablet 4 mg (4 mg Oral Given 6/30/25 0127)   ketorolac injection 15 mg (15 mg Intravenous Given 6/30/25 0125)   morphine injection 4 mg (4 mg Intravenous Given 6/30/25 0242)   iohexoL (OMNIPAQUE 350) injection 100 mL (100 mLs Intravenous Given 6/30/25 0230)     Medical Decision Making  Amount and/or Complexity of Data Reviewed  Labs: ordered. Decision-making details documented in ED Course.  Radiology: ordered. Decision-making details documented in ED Course.    Risk  Prescription drug management.               ED Course as of 06/30/25 0542   Mon Jun 30, 2025   0138 WBC: 11.97 [LC]   0138 Hemoglobin: 14.2 [LC]   0138 Hematocrit: 41.9 [LC]   0146 Protein, UA: Negative [LC]   0146 Glucose, UA: Negative [LC]   0146 Ketones, UA: Negative [LC]   0146 Blood, UA: Negative [LC]   0146 NITRITE UA: Negative [LC]   0147 Leukocyte Esterase, UA: Negative [LC]   0158 Lactic Acid Level: 1.8 [LC]   0213 MCV(!): 79 [LC]   0213 MCH(!): 26.8 [LC]   0213 WBC: 11.97 [LC]   0214 Potassium(!): 3.4 [LC]   0214 MCV(!): 79 [LC]   0214 MCH(!): 26.8 [LC]   0343 WBC: 11.97 [LC]   0352 CT Abdomen Pelvis With IV Contrast NO Oral Contrast(!)  There is a 6.8 mm calculus present at the junction of the bladder wall and distal ureter/UVJ.  This calculus is not completely passed into the bladder and given size may not pass.  There is resulting mild right hydroureter and hydronephrosis.  Perinephric stranding is present [LC]   0532  Case discussed with the on-call urologist, Dr. Harvey Cochran,  who is comfortable with patient being discharged and following up with him in clinic.   I will discharge the patient home with prescriptions for pain medication, nausea medication, Flomax; will provide her with a urinary strainer and a referral to Urology as an outpatient.  I have encouraged her to follow up with the Urology in the timely fashion and to return to the ER immediately for any new or worsening symptoms.  The patient verbalized understanding with this and expressed a willingness to comply with my recommendations.  She is comfortable with plan for discharge at this time. [LC]      ED Course User Index  [LC] Rafa Cardona MD               Medical Decision Making:   Initial Assessment:   See HPI   Clinical Tests:   Lab Tests: Reviewed and Ordered  Radiological Study: Ordered and Reviewed  ED Management:  -   Patient afebrile no acute distress after administration of morphine and Toradol; patient has a 7 mm stone at the right UVJ; there is some mild hydronephrosis and perinephric stranding; did discuss the case results with the on-call urologist who did not feel the patient may need emergent intervention as this time.  He is comfortable plan for discharge and treatment for presumptive expulsion of stone as an outpatient.  I placed an ambulatory referral to Urology for this patient and I have instructed her to return to the ED immediately for any new or worsening of symptoms.  The patient's urinalysis is unremarkable for findings of infection.  Her renal function is within normal limits and she demonstrates no leukocytosis or fever.  Her pain is well controlled in the ED and has been mentioned above will discharge her with prescriptions for pain medication Flomax antiemetics.  Patient comfortable with plan for discharge and outpatient follow up with Urology.  - -No further intervention is indicated at this time after having taken into account the patient's history, physical exam findings, and empirical and objective data obtained during the patient's emergency department workup.   - The  patient is at low risk for an emergent medical condition at this time, and I am of the belief that that it is safe to discharge the patient from the emergency department.   - The patient is instructed to follow up as outpatient as indicated on the discharge paperwork.    - I have discussed the specifics of the workup with the patient and the patient has verbalized understanding of the details of the workup, the diagnosis, the treatment plan, and the need for outpatient follow-up.    - Although the patient has no emergent etiology today this does not preclude the development of an emergent condition so, in addition, I have advised the patient that they can return to the ED and/or activate EMS at any time with worsening of their symptoms, change of their symptoms, or with any other medical complaint.    - The patient remained comfortable and stable during their visit in the ED.    - Discharge and follow-up instructions discussed with the patient who expressed understanding and willingness to comply with my recommendations.  - Results of all emergency department tests  discussed thoroughly with patient; all patient questions answered; pt in agreement with plan  - Pt instructed to follow up with PCP in 2-3 days for recheck of today's complaints  - Pt given strict emergency department return precautions for any new or worsening of symptoms  - Pt discharged from the emergency department in stable condition, in no acute distress                Clinical Impression:  Final diagnoses:  [N20.1] Calculus of ureterovesical junction (UVJ) (Primary)          ED Disposition Condition    Discharge Stable          ED Prescriptions       Medication Sig Dispense Start Date End Date Auth. Provider    oxyCODONE-acetaminophen (PERCOCET) 5-325 mg per tablet Take 1 tablet by mouth every 6 (six) hours as needed for Pain. For moderate to severe pain. 12 tablet 6/30/2025 7/3/2025 Rafa Cardona MD    ketorolac (TORADOL) 10 mg tablet Take  1 tablet (10 mg total) by mouth every 6 (six) hours as needed for Pain. For mild to moderate pain. 15 tablet 6/30/2025 7/3/2025 Rafa Cardona MD    ondansetron (ZOFRAN-ODT) 4 MG TbDL Take 1 tablet (4 mg total) by mouth every 8 (eight) hours as needed (nausea). 12 tablet 6/30/2025 7/3/2025 Rafa Cardona MD    tamsulosin (FLOMAX) 0.4 mg Cap Take 1 capsule (0.4 mg total) by mouth once daily. for 15 days 15 capsule 6/30/2025 7/15/2025 Rafa Cardona MD          Follow-up Information       Follow up With Specialties Details Why Contact Info    Harvey Cochran MD Urology Schedule an appointment as soon as possible for a visit   [address]  [address]  [facility] [address]  [phone]                     [1]   Social History  Tobacco Use    Smoking status: Never    Smokeless tobacco: Never   Substance Use Topics    Alcohol use: No    Drug use: No        Rafa Cardona MD  06/30/25 0568